# Patient Record
Sex: MALE | Race: WHITE | Employment: UNEMPLOYED | URBAN - METROPOLITAN AREA
[De-identification: names, ages, dates, MRNs, and addresses within clinical notes are randomized per-mention and may not be internally consistent; named-entity substitution may affect disease eponyms.]

---

## 2017-12-09 ENCOUNTER — HOSPITAL ENCOUNTER (EMERGENCY)
Age: 47
Discharge: HOME | End: 2017-12-09
Attending: EMERGENCY MEDICINE
Payer: MEDICARE

## 2017-12-09 VITALS
SYSTOLIC BLOOD PRESSURE: 133 MMHG | HEIGHT: 76 IN | WEIGHT: 186 LBS | DIASTOLIC BLOOD PRESSURE: 81 MMHG | OXYGEN SATURATION: 99 % | BODY MASS INDEX: 22.65 KG/M2 | RESPIRATION RATE: 20 BRPM | HEART RATE: 89 BPM | TEMPERATURE: 98.4 F

## 2017-12-09 DIAGNOSIS — Z20.6 EXPOSURE TO HIV: Primary | ICD-10-CM

## 2017-12-09 PROCEDURE — 99282 EMERGENCY DEPT VISIT SF MDM: CPT

## 2017-12-09 RX ORDER — HYDROGEN PEROXIDE 3 %
20 SOLUTION, NON-ORAL MISCELLANEOUS DAILY
COMMUNITY

## 2017-12-09 RX ORDER — HYDROMORPHONE HYDROCHLORIDE 4 MG/1
4 TABLET ORAL
COMMUNITY

## 2017-12-09 RX ORDER — OXYCODONE HCL 40 MG/1
40 TABLET, FILM COATED, EXTENDED RELEASE ORAL 2 TIMES DAILY
COMMUNITY

## 2017-12-09 RX ORDER — EMTRICITABINE AND TENOFOVIR DISOPROXIL FUMARATE 100; 150 MG/1; MG/1
100 TABLET, FILM COATED ORAL DAILY
Qty: 28 TAB | Refills: 0 | Status: SHIPPED | OUTPATIENT
Start: 2017-12-09 | End: 2018-01-06

## 2017-12-09 RX ORDER — ALPRAZOLAM 1 MG/1
1 TABLET ORAL
COMMUNITY

## 2017-12-10 NOTE — ED TRIAGE NOTES
Pt. Complains of having a needle stick from someone that he is sure has HIV positive. Patient has noticeable track marks observed during triage to the left arm while attempting a blood pressure.

## 2017-12-10 NOTE — ED NOTES
Pt. Was asked during routine triage about use of recreational drugs and asked if the needle he was stuck with was from drug use. Pt immediately became upset and angry stating [employer] would you assume I use drugs\" pt was advised they are routine questions and everyone is asked these during triage, pt continued to become upset and angry. Pt was advised again and redirected in an attempt to calm the situation. Pt. Asked for my name and he was given it. CN notified immediately of the situation.

## 2017-12-10 NOTE — DISCHARGE INSTRUCTIONS
Learning About Taking Medicine to Prevent HIV Infections  Introduction    HIV (human immunodeficiency virus) is a virus that attacks the immune system, the body's natural defense system. Without a strong immune system, the body has trouble fighting off disease. HIV is the virus that causes AIDS. Two common ways to get HIV are:  · Having unprotected sex with someone who has HIV. · Sharing needles with someone who has HIV. These things put you at high risk for getting HIV. If you are at risk, you and your doctor can decide if you can take medicines that may lower your risk. Taking these medicines is called pre-exposure prophylaxis (PrEP). How does PrEP work? PrEP can help prevent an HIV infection from taking hold and spreading in your body. Two medicines are combined in one pill called Truvada. You must take it on schedule for it to help protect you from HIV. PrEP works best if you take the medicine every day. It doesn't work well if you don't follow the daily schedule. Do not share your medicine with other people. You will have regular visits with your doctor. He or she will check to see how you are doing while taking the medicine. You'll be tested for HIV. Your doctor may also talk to you about other steps you can take to avoid HIV infection. These include practicing safer sex and not injecting illegal drugs with shared needles. What else should you know about PrEP? PrEP does not remove all risk of getting HIV. While you take the medicine, avoid risky actions like having unprotected sex and sharing needles. PrEP can help you have a baby safely when your partner has an HIV infection. It can help prevent the infection from spreading to you or your baby. Your doctor can discuss this and other options with you. If you are infected with HIV, your doctor may give you Truvada along with other medicine to treat HIV. Be safe with medicines. Take your medicines exactly as prescribed.  Call your doctor if you think you are having a problem with your medicine. You may be able to pay less for PrEP medicines. Many health insurance plans cover the cost of PrEP. There are programs that provide PrEP for free or at a lower cost for people who need help paying for it. Follow-up care is a key part of your treatment and safety. Be sure to make and go to all appointments, and call your doctor if you are having problems. It's also a good idea to know your test results and keep a list of the medicines you take. Where can you learn more? Go to http://augusto-vidal.info/. Enter U266 in the search box to learn more about \"Learning About Taking Medicine to Prevent HIV Infections. \"  Current as of: March 3, 2017  Content Version: 11.4  © 1749-3727 [employer]. Care instructions adapted under license by [employer] (which disclaims liability or warranty for this information). If you have questions about a medical condition or this instruction, always ask your healthcare professional. [address] any warranty or liability for your use of this information.

## 2017-12-10 NOTE — ED PROVIDER NOTES
EMERGENCY DEPARTMENT HISTORY AND PHYSICAL EXAM    9:31 PM      Date: 12/9/2017  Patient Name: Rose Patterson    History of Presenting Illness     Chief Complaint   Patient presents with    Exposure To HIV or AIDS         History Provided By: Patient    Chief Complaint: needle stick  Duration:  Occurred yesterday  Timing:  Acute  Location: finger   Quality: Sharp  Severity:  Modifying Factors:   Associated Symptoms: denies any other associated signs or symptoms      Additional History (Context): Rose Patterson is a 49 y.o. male with No significant past medical history who presents after needle stick yesterday. His wife is HIV positive with currently detectable titer and takes insulin. Patient was cleaning up her needles 5-7 minutes after use when the needle stick occurred. He does not currently take prophylactic drugs, but took a course of these mediations after a previous similar incident. No other symptoms or concerns were expressed. PCP: PROVIDER UNKNOWN    Current Outpatient Prescriptions   Medication Sig Dispense Refill    esomeprazole (NEXIUM) 20 mg capsule Take 20 mg by mouth daily.  oxyCODONE ER (OXYCONTIN) 40 mg ER tablet Take 40 mg by mouth two (2) times a day.  HYDROmorphone (DILAUDID) 4 mg tablet Take 4 mg by mouth every four (4) hours as needed for Pain.  ALPRAZolam (XANAX) 1 mg tablet Take 1 mg by mouth three (3) times daily as needed for Anxiety.  emtricitabine-tenofovir, TDF, (TRUVADA) 100-150 mg tab Take 100 mg by mouth daily for 28 days. Indications: Prevention of HIV Infection after Exposure 28 Tab 0       Past History     Past Medical History:  History reviewed. No pertinent past medical history. Past Surgical History:  Past Surgical History:   Procedure Laterality Date    HX ORTHOPAEDIC      back surgery       Family History:  History reviewed. No pertinent family history.     Social History:  Social History   Substance Use Topics    Smoking status: Current Every Day Smoker     Packs/day: 1.00     Years: 36.00    Smokeless tobacco: Never Used    Alcohol use No       Allergies:  No Known Allergies      Review of Systems       Review of Systems   Constitutional: Negative. HENT: Negative. Eyes: Negative. Respiratory: Negative. Cardiovascular: Negative. Gastrointestinal: Negative. Endocrine: Negative. Genitourinary: Negative. Musculoskeletal: Negative. Skin: Negative. (+) needle stick   Allergic/Immunologic: Negative. Neurological: Negative. Hematological: Negative. Psychiatric/Behavioral: Negative. All other systems reviewed and are negative. Physical Exam     Visit Vitals    /81 (BP 1 Location: Left arm, BP Patient Position: At rest)    Pulse 89    Temp 98.4 °F (36.9 °C)    Resp 20    Ht 6' 4\" (1.93 m)    Wt 84.4 kg (186 lb)    SpO2 99%    BMI 22.64 kg/m2         Physical Exam   Constitutional: He is oriented to person, place, and time. He appears well-developed and well-nourished. No distress. HENT:   Head: Normocephalic. Mouth/Throat: Oropharynx is clear and moist.   Eyes: Conjunctivae and EOM are normal. Pupils are equal, round, and reactive to light. Neck: Normal range of motion. Neck supple. Cardiovascular: Normal rate, regular rhythm, normal heart sounds and intact distal pulses. No murmur heard. Pulmonary/Chest: Effort normal and breath sounds normal. No respiratory distress. He has no wheezes. He has no rales. He exhibits no tenderness. Abdominal: Soft. Bowel sounds are normal. He exhibits no distension. There is no tenderness. There is no rebound. Musculoskeletal: Normal range of motion. He exhibits no edema or tenderness. Neurological: He is alert and oriented to person, place, and time. No cranial nerve deficit. He exhibits normal muscle tone. Coordination normal.   Skin: Skin is warm and dry. No rash noted.    LEFT FOREARM: (+) recent tract marks, no signs of infection (erythema, flatulence or tenderness). (+) multiple tattoos. Psychiatric: He has a normal mood and affect. Patient appeared agitated and acting as if he may have been under influence of drugs. Patient however was alert and oriented x 3 with a normal gait. Nursing note and vitals reviewed. Diagnostic Study Results     Labs -  No results found for this or any previous visit (from the past 12 hour(s)). Radiologic Studies -   No orders to display         Medical Decision Making   I am the first provider for this patient. I reviewed the vital signs, available nursing notes, past medical history, past surgical history, family history and social history. Vital Signs-Reviewed the patient's vital signs. Pulse Oximetry Analysis -  99% on room air     Records Reviewed: Nursing Notes (Time of Review: 9:31 PM)    ED Course: Progress Notes, Reevaluation, and Consults:  10:00 PM  Patient refuses to undergo blood tests. Provider Notes (Medical Decision Making):   Patient's wife, who is present in the ED, states that her viral load is currently detectable, which will necessitate a treatment schedule of 28 days for the patient. An HIV test will also be given. Diagnosis     Clinical Impression:   1. Exposure to HIV        Disposition: Discharge    Follow-up Information     Follow up With Details Comments Contact Info    your infectious disease doctor Call in 1 day ED visit follow-up     [facility] Go to As needed, If symptoms worsen [address] [facility]  [phone]           Patient's Medications   Start Taking    EMTRICITABINE-TENOFOVIR, TDF, (TRUVADA) 100-150 MG TAB    Take 100 mg by mouth daily for 28 days. Indications: Prevention of HIV Infection after Exposure   Continue Taking    ALPRAZOLAM (XANAX) 1 MG TABLET    Take 1 mg by mouth three (3) times daily as needed for Anxiety. ESOMEPRAZOLE (NEXIUM) 20 MG CAPSULE    Take 20 mg by mouth daily.     HYDROMORPHONE (DILAUDID) 4 MG TABLET    Take 4 mg by mouth every four (4) hours as needed for Pain. OXYCODONE ER (OXYCONTIN) 40 MG ER TABLET    Take 40 mg by mouth two (2) times a day. These Medications have changed    No medications on file   Stop Taking    No medications on file     _______________________________    Attestations:  [facility] acting as a scribe for and in the presence of Pretty Roqeu MD      December 09, 2017 at 10:01 PM       Provider Attestation:      I personally performed the services described in the documentation, reviewed the documentation, as recorded by the scribe in my presence, and it accurately and completely records my words and actions.  December 09, 2017 at 10:01 PM - Pretty Roque MD    _______________________________